# Patient Record
Sex: FEMALE | Race: WHITE | URBAN - METROPOLITAN AREA
[De-identification: names, ages, dates, MRNs, and addresses within clinical notes are randomized per-mention and may not be internally consistent; named-entity substitution may affect disease eponyms.]

---

## 2023-10-06 ENCOUNTER — HOME HEALTH ADMISSION (OUTPATIENT)
Age: 58
End: 2023-10-06
Payer: COMMERCIAL

## 2023-10-07 ENCOUNTER — HOME CARE VISIT (OUTPATIENT)
Age: 58
End: 2023-10-07
Payer: COMMERCIAL

## 2023-10-07 VITALS
OXYGEN SATURATION: 98 % | TEMPERATURE: 97.8 F | RESPIRATION RATE: 18 BRPM | HEART RATE: 89 BPM | SYSTOLIC BLOOD PRESSURE: 130 MMHG | DIASTOLIC BLOOD PRESSURE: 70 MMHG

## 2023-10-07 PROCEDURE — G0151 HHCP-SERV OF PT,EA 15 MIN: HCPCS

## 2023-10-07 ASSESSMENT — ENCOUNTER SYMPTOMS
PAIN LOCATION - PAIN QUALITY: ACHING
DYSPNEA ACTIVITY LEVEL: AFTER AMBULATING MORE THAN 20 FT

## 2023-10-07 NOTE — HOME HEALTH
Skilled services/Home bound verification:     Skilled Reason for admission/summary of clinical condition:  L THR   This patient is homebound for the following reasons Requires considerable and taxing effort to leave the home . Caregiver: friend. Caregiver assists with IADL's. Medications reconciled and all medications are available in the home this visit. High risk medication teaching regarding anticoagulants, antiplatelets, antibiotics, antipsychotics, hypoglycemic agents, or opioid/narcotics performed (specify): percocet for risk of overdose     Dr. Raffy Wayne  notified of any discrepancies/look a like medications/medication interactions no severe interaction noted . Home health supplies by type and quantity ordered/delivered this visit include: none     Patient education provided this visit to include: HEP, hip precaution, fall prevention, dc planning     Patient level of understanding of education provided: Patient was able to partially teach back HEP     Sharps Education Provided: n/a  Patient response to procedure performed: Patient needed verbal cues for HEP     Home exercise program/Homework provided: Patient was educated with ankle pumps, ankle circles, quad sets, heel slides, gluteal stes x 10 reps daily x 3 sets for purpose of increasing MMT and ROM on LLE to improve ability for transfers and gait. patient was educated with importance of using AD at all times, wearing proper footwear and maintaining clear mobility pathway to prevent LOB and falls,      Pt/Caregiver instructed on plan of care and are agreeable to plan of care at this time. Physician Dr. Raffy Wayne  notified of patient admission to home health and plan of care including anticipated frequency of 1w1 4w1 3w1 for PT     Discharge planning discussed with patient and caregiver. Discharge planning as follows: dc when all goals are met . Pt/Caregiver did verbalize understanding of discharge planning.      Next MD

## 2023-10-08 ENCOUNTER — HOME CARE VISIT (OUTPATIENT)
Age: 58
End: 2023-10-08
Payer: COMMERCIAL

## 2023-10-08 PROCEDURE — G2168 SVS BY PT IN HOME HEALTH: HCPCS

## 2023-10-09 VITALS
RESPIRATION RATE: 14 BRPM | DIASTOLIC BLOOD PRESSURE: 72 MMHG | HEART RATE: 88 BPM | OXYGEN SATURATION: 95 % | SYSTOLIC BLOOD PRESSURE: 126 MMHG | TEMPERATURE: 98.7 F

## 2023-10-09 ASSESSMENT — ENCOUNTER SYMPTOMS: PAIN LOCATION - PAIN QUALITY: SORENESS

## 2023-10-09 NOTE — HOME HEALTH
time. Continued need for skilled home health PT at this time to address deficits, reduce risk of falls, and obtain goals as previously established per plan of care. Attempt stair training and possibly outside ambulation for when patient transitions back to her home. Monitor for form with all exercises to maximize effectiveness.

## 2023-10-10 ENCOUNTER — HOME CARE VISIT (OUTPATIENT)
Age: 58
End: 2023-10-10
Payer: COMMERCIAL

## 2023-10-10 PROCEDURE — G0157 HHC PT ASSISTANT EA 15: HCPCS

## 2023-10-11 ENCOUNTER — HOME CARE VISIT (OUTPATIENT)
Age: 58
End: 2023-10-11
Payer: COMMERCIAL

## 2023-10-11 PROCEDURE — G0157 HHC PT ASSISTANT EA 15: HCPCS

## 2023-10-13 VITALS
HEART RATE: 72 BPM | RESPIRATION RATE: 18 BRPM | DIASTOLIC BLOOD PRESSURE: 60 MMHG | TEMPERATURE: 98.5 F | HEART RATE: 64 BPM | SYSTOLIC BLOOD PRESSURE: 120 MMHG | TEMPERATURE: 99.1 F | SYSTOLIC BLOOD PRESSURE: 120 MMHG | DIASTOLIC BLOOD PRESSURE: 72 MMHG | RESPIRATION RATE: 18 BRPM

## 2023-10-13 ASSESSMENT — ENCOUNTER SYMPTOMS: PAIN LOCATION - PAIN QUALITY: ACHE

## 2023-10-13 NOTE — HOME HEALTH
SUBJECTIVE: Patient reports she has chronic back problems. CAREGIVER INVOLVEMENT/ASSISTANCE NEEDED FOR: Recovering at her friend's home whois able to offer any assist as needed. patient manages her own meds. HOME HEALTH SUPPLIES BY TYPE AND QUANTITY ORDERED/DELIVERED THIS VISIT INCLUDE: None  OBJECTIVE:  See interventions. PATIENT RESPONSE TO TREATMENT:  Patient reports slight soreness  increase in L hip follwoing treatment-patient will utilize rest and cryotherapy to address  PATIENT LEVEL OF UNDERSTANDING OF EDUCATION PROVIDED: Patient able to name 3/3 THR precautions after instruction . Patient instructed in fall risk preventiona nd pain management  ASSESSMENT OF PROGRESS TOWARD GOALS: Patient experienced a positive outcome of this physical therapy visit as evidenced by improvements in gait patterning when using FWW after instruction by return demosntration with Sup for  each foot passisng the other and heel/toe patterning. Patient able to imporve sit to stand with instruction for scooting forward prior to stand to increase inital stance balance and utilzing B UE to push up and reach back for sitting surfance fromrecliner and bed form MIn A to Sup following instruction. Progressing well towards goals. CONTINUED NEED FOR THE FOLLOWING SKILLS: Patient will be seen 4 x week for Physical Therapy to continue to work toward goals within POC and HHPT is medically necessary to address  dx and clinical findings: decreased ROM, decreased strength, impaired gait, decreased ability w stair negotiation, increased swelling, decreased bed mobility, decreased transfer status, decreased endurance, decreased balance and decreased safety, increased pain in order to improve functional mobility/quality of life, decrease burden of care, reduce risk for re-hospitalization, work towards patient's personal goals of return to PLOF w decrease risk for falls.   PLAN FOR NEXT VISIT: Gait/transfer training, stengthening exercises,

## 2023-10-13 NOTE — HOME HEALTH
SUBJECTIVE: Patient reports she is no longer constipated-taking Dulcolax ad directed  CAREGIVER INVOLVEMENT/ASSISTANCE NEEDED FOR: Patient is staying at her friend's home who offers assist with all aspects of care as needed  [address] ORDERED/DELIVERED THIS VISIT INCLUDE: None  OBJECTIVE:  See interventions. PATIENT RESPONSE TO TREATMENT:  Good- remained at a 6/10 PQ reported in L hip-addressed with rest and cryotherapy  PATIENT LEVEL OF UNDERSTANDING OF EDUCATION PROVIDED: Patient educated with THR precautions-patient able to verbalize without cues required this visit, pain mgmt and fall risk prevention with good understanding. Goal met with cryotherapy-patient able to verbalize  ASSESSMENT OF PROGRESS TOWARD GOALS: Patient experienced a positive outcome of this physical therapy visit as evidenced by no report of increased pain, improvement in gait with FWW with good foot clearance and each foot able to pass the other for more fluidity of gait and cues for safe walker distancing/upright posturing-needs consistent cues-Sup. Sit to stand with B UE to push up from rocker recliner(preferred sitting chair) with instructions not to exceed 90 degrees with L hip when coming forward -Sup. Able to use step to patterning and cues to ascend/descend 4 steps and threshold step to enter/exit home with rail and quad cane for support  Adjusted quad cane for use on the R side to decrease fall risk. Progressing towards goals-working on consistency with safe walker distancing to decrease overall fall risk.   CONTINUED NEED FOR THE FOLLOWING SKILLS: Patient will be seen 4 x week for Physical Therapy to continue to work toward goals within POC and HHPT is medically necessary to address  dx and clinical findings: decreased ROM, decreased strength, impaired gait, decreased ability w stair negotiation, increased swelling, decreased bed mobility, decreased transfer status, decreased endurance, decreased

## 2023-10-14 ENCOUNTER — HOME CARE VISIT (OUTPATIENT)
Age: 58
End: 2023-10-14
Payer: COMMERCIAL

## 2023-10-14 PROCEDURE — G0157 HHC PT ASSISTANT EA 15: HCPCS

## 2023-10-15 VITALS
TEMPERATURE: 98.2 F | SYSTOLIC BLOOD PRESSURE: 124 MMHG | OXYGEN SATURATION: 97 % | HEART RATE: 84 BPM | DIASTOLIC BLOOD PRESSURE: 62 MMHG

## 2023-10-16 ENCOUNTER — HOME CARE VISIT (OUTPATIENT)
Age: 58
End: 2023-10-16
Payer: COMMERCIAL

## 2023-10-16 PROCEDURE — G0157 HHC PT ASSISTANT EA 15: HCPCS

## 2023-10-18 ENCOUNTER — HOME CARE VISIT (OUTPATIENT)
Age: 58
End: 2023-10-18
Payer: COMMERCIAL

## 2023-10-18 PROCEDURE — G0157 HHC PT ASSISTANT EA 15: HCPCS

## 2023-10-19 ENCOUNTER — HOME CARE VISIT (OUTPATIENT)
Age: 58
End: 2023-10-19
Payer: COMMERCIAL

## 2023-10-19 VITALS
OXYGEN SATURATION: 98 % | SYSTOLIC BLOOD PRESSURE: 114 MMHG | RESPIRATION RATE: 18 BRPM | TEMPERATURE: 98.6 F | OXYGEN SATURATION: 98 % | DIASTOLIC BLOOD PRESSURE: 62 MMHG | TEMPERATURE: 98 F | RESPIRATION RATE: 16 BRPM | HEART RATE: 92 BPM | HEART RATE: 78 BPM | SYSTOLIC BLOOD PRESSURE: 120 MMHG | DIASTOLIC BLOOD PRESSURE: 58 MMHG

## 2023-10-19 VITALS
TEMPERATURE: 97.2 F | DIASTOLIC BLOOD PRESSURE: 76 MMHG | OXYGEN SATURATION: 96 % | HEART RATE: 84 BPM | SYSTOLIC BLOOD PRESSURE: 130 MMHG | RESPIRATION RATE: 14 BRPM

## 2023-10-19 PROCEDURE — G0151 HHCP-SERV OF PT,EA 15 MIN: HCPCS

## 2023-10-19 ASSESSMENT — ENCOUNTER SYMPTOMS
PAIN LOCATION - PAIN QUALITY: ACHE
PAIN LOCATION - PAIN QUALITY: DULL ACHING
PAIN LOCATION - PAIN QUALITY: ACHE

## 2023-10-19 NOTE — HOME HEALTH
SUBJECTIVE: I am doing well today. I saw the doctor brittney and they said everything is going well. I am not going to go to outpatient therapy at this time as I can not afford it and the doctor is aware  REQUIRES CAREGIVER ASSISTANCE FOR: transportation, medication   [address] no changes   NEXT MD APPT: Kristie Newman 11/29/23  ROM:B LE WFL   STRENGTH: L hip not assessed due to hip restriction, L knee flexor and extensor +4/5, RLE wfl  WOUNDS:L hip incision open to air. Clean dry and intact. Stri strips intact No drainage noted. No signs or symptoms of infection noted   BED MOBILITY:supine<>sit MOD I  TRANSFERS:sit to stand from recliner chair and bed with B UE support MOD I. sit to stand from chair with and without B UE support MOD I  GAIT: Pt amb 150 ft with SBQ on flat level and unevens surfaces with reciprocal gait pattern MOD I. No balance checks noted with amb. B feet clear the floor during swiing phase. Per PTA visit 10/18/23 Gait training on even surfaces within home with cane for support-first few feet very stiff however patterning improved with time and distance-lena 200' -cues for equalizing wt bearing, for each foot to pass the other and decreased WB on the cane-overall difficult for patient to shift L however she is able to slightly improve to increase overall balance   STAIRS:Pt went up and down 4 steps with 1 HR and SBQC with step too gait pattern MOD I. BALANCE: Pt scored 24/28 on Tinetti Balance Assessment placing pt at low risk for falls.    PATIENT RESPONE TO TX: Pt pain level remained the same throughout tx session   PATIENT LEVEL OF UNDERSTANDING OF EDUCATION PROVIDED Pt educated to use SBQC/RW at all times when amb for safety   PATIENT EDUCATION PROVIDED THIS VISIT: safety, HEP, walking, deep breathing,          HEP consisting of:  in supine: quad/glute set, L heel slides  in sitting: L LAQ, B heel/toe raises  in standing either in door frame or kitchen counter for

## 2023-10-19 NOTE — HOME HEALTH
SUBJECTIVE: Patient reports she was seen by MD and given a \"good report. \" She states bandage was removed and steri strips are in place. CAREGIVER INVOLVEMENT/ASSISTANCE NEEDED FOR: Patient is staying at her friend's home who offers assist with all aspects of care as needed    [address] ORDERED/DELIVERED THIS VISIT INCLUDE: None    OBJECTIVE:  See interventions. PATIENT RESPONSE TO TREATMENT:  Good-6/10 following treatment -plans to utilize ice and rest for pain mgmt     PATIENT LEVEL OF UNDERSTANDING OF EDUCATION PROVIDED:Educational goals met     ASSESSMENT OF PROGRESS TOWARD GOALS: Patient experienced a positive outcome of this physical therapy visit as evidenced by gait training lena 200' within home with cane and cues for each foot to pass the other and to decrease overall shift to the R-patient able to improve slightly following instruction by return demosntration-Mod I. Tinetti score increased from 11/28 to 23/28 palcing patient from a high fall risk category to a medium fall risk category. Sit to stand no longer requires B UE to push up form rocker/recliner. Patient is in agreement with discharge and plans to continue with HEP and increasing walking with cane rather than attend outpatient therapy. Patient plans to return home after discharge visit. Assessment and Summary of Care:  Patient's current functional status before discharge is as follows  Strength: Patient is able to sit to stand from sitting surface without assist-goal met  ROM:N/T  Bed Mobility: Independent-goal met  Transfers: Sit to stand without B UE required-Mod I  Gait/WC mobility: Able to amb 200' with cane with shift R and neither foot passing the other Mod I. To continue wt shifting in standing to increase awareness. Stairs:  Mod I for 5 steps to enter/egress friend's home with step to patterning andrail for support  Special Tests: Tinetti test 23/28  Recommendations: Patient plans to return home

## 2023-10-19 NOTE — HOME HEALTH
SUBJECTIVE: Patient reports she has been regularly taking a stool softener and reports she is not constipated. CAREGIVER INVOLVEMENT/ASSISTANCE NEEDED FOR: Patient is staying at her friend's home who offers assist with all aspects of care as needed  [address] ORDERED/DELIVERED THIS VISIT INCLUDE: None  OBJECTIVE:  See interventions. PATIENT RESPONSE TO TREATMENT:  Good-6/10 following treatment-5/10 PQ initally reported -plans to utilize ice and rest to decrease overall pain for L hip    PATIENT LEVEL OF UNDERSTANDING OF EDUCATION PROVIDED:Instruction with patient to return to [facility] if pain/edema increase, if ill, dizziness or with fatigue, and inclement weather-while on pain meds to utilize FWW for night trips to the bathroom-patient agreed. Increased awareness in application of 3/3 THR precautions with functional mobility after instruction. Patient able to verbalize sxs of infection and cryotherapy following review/instruction. ASSESSMENT OF PROGRESS TOWARD GOALS:Patient experienced a positive outcome of this physical therapy visit as evidenced by ability to progress to a  cane as an AD for gait. Overall continues to be shifted R-cues for equalizing wt bearing and instruction with wt shifting in stand to increase patient's self awareness of this for carry over for gait and transfers. Patient able to have good sequencing with no balance checks after instruction by return demonstration. She is able to effectively utilize her cane and rail for ascending/descending front steps with step to patterning to enter/egress her friend's home after instruction by return demostration - Mod I. Sit to stand from recliner requires B UE to push up with L foot forward and shift R -working to [employer] for increased inital stance balance by wt shifting in standing-Sup.   Progressing towards goals     CONTINUED NEED FOR THE FOLLOWING SKILLS: Patient will be seen 3 x week for Physical Therapy to